# Patient Record
Sex: MALE | Race: WHITE | Employment: OTHER | URBAN - METROPOLITAN AREA
[De-identification: names, ages, dates, MRNs, and addresses within clinical notes are randomized per-mention and may not be internally consistent; named-entity substitution may affect disease eponyms.]

---

## 2017-04-18 PROCEDURE — 84403 ASSAY OF TOTAL TESTOSTERONE: CPT | Performed by: INTERNAL MEDICINE

## 2017-04-18 PROCEDURE — 81003 URINALYSIS AUTO W/O SCOPE: CPT | Performed by: INTERNAL MEDICINE

## 2017-04-18 PROCEDURE — 84402 ASSAY OF FREE TESTOSTERONE: CPT | Performed by: INTERNAL MEDICINE

## 2017-06-07 PROBLEM — N41.9 PROSTATITIS, UNSPECIFIED PROSTATITIS TYPE: Status: ACTIVE | Noted: 2017-06-07

## 2017-06-28 PROBLEM — M54.2 NECK PAIN: Status: ACTIVE | Noted: 2017-06-28

## 2017-12-29 PROCEDURE — 86618 LYME DISEASE ANTIBODY: CPT | Performed by: OTHER

## 2017-12-29 PROCEDURE — 83921 ORGANIC ACID SINGLE QUANT: CPT | Performed by: OTHER

## 2017-12-29 PROCEDURE — 82746 ASSAY OF FOLIC ACID SERUM: CPT | Performed by: OTHER

## 2017-12-29 PROCEDURE — 84425 ASSAY OF VITAMIN B-1: CPT | Performed by: OTHER

## 2017-12-29 PROCEDURE — 82607 VITAMIN B-12: CPT | Performed by: OTHER

## 2018-02-01 ENCOUNTER — OFFICE VISIT (OUTPATIENT)
Dept: SURGERY | Facility: CLINIC | Age: 43
End: 2018-02-01

## 2018-02-01 VITALS — SYSTOLIC BLOOD PRESSURE: 122 MMHG | HEART RATE: 60 BPM | DIASTOLIC BLOOD PRESSURE: 72 MMHG

## 2018-02-01 DIAGNOSIS — G95.0 SYRINX OF SPINAL CORD (HCC): Primary | ICD-10-CM

## 2018-02-01 DIAGNOSIS — M50.30 DDD (DEGENERATIVE DISC DISEASE), CERVICAL: ICD-10-CM

## 2018-02-01 DIAGNOSIS — M51.34 DDD (DEGENERATIVE DISC DISEASE), THORACIC: ICD-10-CM

## 2018-02-01 DIAGNOSIS — M50.20 BULGE OF CERVICAL DISC WITHOUT MYELOPATHY: ICD-10-CM

## 2018-02-01 DIAGNOSIS — M54.2 NECK PAIN ON LEFT SIDE: ICD-10-CM

## 2018-02-01 DIAGNOSIS — R90.89 ABNORMAL BRAIN MRI: ICD-10-CM

## 2018-02-01 PROCEDURE — 99204 OFFICE O/P NEW MOD 45 MIN: CPT | Performed by: PHYSICIAN ASSISTANT

## 2018-02-01 NOTE — H&P
NEUROSURGERY CLINIC VISIT    Dustin Cook  2/24/1975      No chief complaint on file.         HPI:   Dustin Cook is a 43year old male with a PMH of hyperacusis, bila Current Outpatient Prescriptions:  finasteride 1 MG Oral Tab TAKE 1 TABLET(1 MG) BY MOUTH DAILY Disp: 90 tablet Rfl: 3     No family history on file.   Smoking status: Former Smoker                                                              Packs/day: 0.0 TECHNIQUE: Routine noncontrast MRI dorsal spine study utilizing standard protocol. FINDINGS:   Alignment: Within normal limits  Bone marrow signal: Within normal limits  Thoracic spinal cord:  There is prominence of the central canal of the spinal cord fro COMPARISON: None  FINDINGS:     Alignment: Within normal limits  Bone marrow signal: Unremarkable  Cervicomedullary junction: Unremarkable  Cervical spinal cord: Normal in signal and caliber.   Discs: Well-maintained  Vertebral body heights: Well-maintained TECHNIQUE: Multiplanar multisequence pre and postcontrast MR imaging of the brain was performed  utilizing a 1.5 Era closed system. CONTRAST: Post Contrast IV gadolinium enhanced MR imaging of the brain was performed.  IV gadolinium  was given uneventful measuring 9 mm, most likely a hemangioma. Vascular system: The flow voids for the proximal intracranial arteries and dural sinuses are intact. Visualized Paranasal sinuses: Mild/moderate diffuse ethmoidal mucosal thickening.  Small inferior  maxillary sin - Please see imaging section. Agree with radiology. Thoracic DDD with thoracic syrinx visualized, slight irregularities more inferior to the syrinx. Will order further imaging to rule out other abnormalities or tumor causing the irregularity.  Will order

## 2018-02-01 NOTE — PATIENT INSTRUCTIONS
Refill policies:    • Allow 2-3 business days for refills; controlled substances may take longer.   • Contact your pharmacy at least 5 days prior to running out of medication and have them send an electronic request or submit request through the [facility] recommended that you have a procedure or additional testing performed. [facility]) will contact your insurance carrier to obtain pre-certification or prior authorization.     Unfortunately, [employer] has seen an increase in denial of paym